# Patient Record
Sex: MALE | Race: WHITE
[De-identification: names, ages, dates, MRNs, and addresses within clinical notes are randomized per-mention and may not be internally consistent; named-entity substitution may affect disease eponyms.]

---

## 2018-01-19 ENCOUNTER — HOSPITAL ENCOUNTER (EMERGENCY)
Dept: HOSPITAL 17 - PHED | Age: 23
LOS: 1 days | Discharge: HOME | End: 2018-01-20
Payer: SELF-PAY

## 2018-01-19 VITALS — BODY MASS INDEX: 25.4 KG/M2 | HEIGHT: 66 IN | WEIGHT: 158.07 LBS

## 2018-01-19 VITALS
RESPIRATION RATE: 18 BRPM | DIASTOLIC BLOOD PRESSURE: 92 MMHG | OXYGEN SATURATION: 98 % | TEMPERATURE: 97.7 F | HEART RATE: 71 BPM | SYSTOLIC BLOOD PRESSURE: 175 MMHG

## 2018-01-19 DIAGNOSIS — K08.89: Primary | ICD-10-CM

## 2018-01-19 DIAGNOSIS — R11.10: ICD-10-CM

## 2018-01-19 DIAGNOSIS — J40: ICD-10-CM

## 2018-01-19 DIAGNOSIS — Z72.0: ICD-10-CM

## 2018-01-19 PROCEDURE — 99284 EMERGENCY DEPT VISIT MOD MDM: CPT

## 2018-01-19 PROCEDURE — 94664 DEMO&/EVAL PT USE INHALER: CPT

## 2018-01-19 PROCEDURE — 71045 X-RAY EXAM CHEST 1 VIEW: CPT

## 2018-01-19 PROCEDURE — 96372 THER/PROPH/DIAG INJ SC/IM: CPT

## 2018-01-20 VITALS
DIASTOLIC BLOOD PRESSURE: 88 MMHG | RESPIRATION RATE: 18 BRPM | OXYGEN SATURATION: 96 % | SYSTOLIC BLOOD PRESSURE: 145 MMHG | HEART RATE: 69 BPM

## 2018-01-20 VITALS — RESPIRATION RATE: 18 BRPM
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
